# Patient Record
Sex: FEMALE | Race: ASIAN | NOT HISPANIC OR LATINO | Employment: UNEMPLOYED | URBAN - METROPOLITAN AREA
[De-identification: names, ages, dates, MRNs, and addresses within clinical notes are randomized per-mention and may not be internally consistent; named-entity substitution may affect disease eponyms.]

---

## 2023-11-20 ENCOUNTER — APPOINTMENT (OUTPATIENT)
Dept: RADIOLOGY | Facility: HOSPITAL | Age: 43
End: 2023-11-20
Attending: EMERGENCY MEDICINE
Payer: COMMERCIAL

## 2023-11-20 ENCOUNTER — APPOINTMENT (OUTPATIENT)
Dept: ULTRASOUND IMAGING | Facility: HOSPITAL | Age: 43
End: 2023-11-20
Attending: EMERGENCY MEDICINE
Payer: COMMERCIAL

## 2023-11-20 ENCOUNTER — HOSPITAL ENCOUNTER (EMERGENCY)
Facility: HOSPITAL | Age: 43
Discharge: HOME | End: 2023-11-20
Attending: EMERGENCY MEDICINE | Admitting: EMERGENCY MEDICINE
Payer: COMMERCIAL

## 2023-11-20 VITALS
WEIGHT: 140 LBS | OXYGEN SATURATION: 95 % | BODY MASS INDEX: 24.8 KG/M2 | TEMPERATURE: 98.5 F | DIASTOLIC BLOOD PRESSURE: 79 MMHG | HEIGHT: 63 IN | HEART RATE: 96 BPM | SYSTOLIC BLOOD PRESSURE: 130 MMHG | RESPIRATION RATE: 16 BRPM

## 2023-11-20 DIAGNOSIS — N39.0 UTI (URINARY TRACT INFECTION): ICD-10-CM

## 2023-11-20 DIAGNOSIS — M25.532 LEFT WRIST PAIN: ICD-10-CM

## 2023-11-20 DIAGNOSIS — H66.90 OTITIS MEDIA: ICD-10-CM

## 2023-11-20 DIAGNOSIS — J45.901 ASTHMA EXACERBATION: ICD-10-CM

## 2023-11-20 LAB
ALBUMIN MFR UR ELPH: 88.4 MG/DL
ALBUMIN SERPL BCG-MCNC: 3.9 G/DL (ref 3.5–5.2)
ALBUMIN UR-MCNC: NEGATIVE MG/DL
ALP SERPL-CCNC: 129 U/L (ref 40–150)
ALT SERPL W P-5'-P-CCNC: 40 U/L (ref 0–50)
ANION GAP SERPL CALCULATED.3IONS-SCNC: 12 MMOL/L (ref 7–15)
APPEARANCE UR: CLEAR
AST SERPL W P-5'-P-CCNC: 25 U/L (ref 0–45)
ATRIAL RATE - MUSE: 107 BPM
BACTERIA #/AREA URNS HPF: ABNORMAL /HPF
BILIRUB DIRECT SERPL-MCNC: <0.2 MG/DL (ref 0–0.3)
BILIRUB SERPL-MCNC: 0.5 MG/DL
BILIRUB UR QL STRIP: NEGATIVE
BUN SERPL-MCNC: 12.8 MG/DL (ref 6–20)
CALCIUM SERPL-MCNC: 9.2 MG/DL (ref 8.6–10)
CHLORIDE SERPL-SCNC: 103 MMOL/L (ref 98–107)
COLOR UR AUTO: ABNORMAL
CREAT SERPL-MCNC: 0.7 MG/DL (ref 0.51–0.95)
CREAT UR-MCNC: 141.6 MG/DL
DEPRECATED HCO3 PLAS-SCNC: 25 MMOL/L (ref 22–29)
DIASTOLIC BLOOD PRESSURE - MUSE: NORMAL MMHG
EGFRCR SERPLBLD CKD-EPI 2021: >90 ML/MIN/1.73M2
ERYTHROCYTE [DISTWIDTH] IN BLOOD BY AUTOMATED COUNT: 11.9 % (ref 10–15)
GLUCOSE SERPL-MCNC: 104 MG/DL (ref 70–99)
GLUCOSE UR STRIP-MCNC: NEGATIVE MG/DL
HCT VFR BLD AUTO: 38.8 % (ref 35–47)
HGB BLD-MCNC: 12.8 G/DL (ref 11.7–15.7)
HGB UR QL STRIP: ABNORMAL
HYALINE CASTS: 1 /LPF
INTERPRETATION ECG - MUSE: NORMAL
KETONES UR STRIP-MCNC: NEGATIVE MG/DL
LEUKOCYTE ESTERASE UR QL STRIP: ABNORMAL
MCH RBC QN AUTO: 30 PG (ref 26.5–33)
MCHC RBC AUTO-ENTMCNC: 33 G/DL (ref 31.5–36.5)
MCV RBC AUTO: 91 FL (ref 78–100)
MUCOUS THREADS #/AREA URNS LPF: PRESENT /LPF
NITRATE UR QL: NEGATIVE
P AXIS - MUSE: 38 DEGREES
PH UR STRIP: 7 [PH] (ref 5–7)
PLATELET # BLD AUTO: 532 10E3/UL (ref 150–450)
POTASSIUM SERPL-SCNC: 3.3 MMOL/L (ref 3.4–5.3)
PR INTERVAL - MUSE: 176 MS
PROT SERPL-MCNC: 7.8 G/DL (ref 6.4–8.3)
PROT/CREAT 24H UR: 0.62 MG/MG CR (ref 0–0.2)
QRS DURATION - MUSE: 88 MS
QT - MUSE: 338 MS
QTC - MUSE: 451 MS
R AXIS - MUSE: 32 DEGREES
RBC # BLD AUTO: 4.27 10E6/UL (ref 3.8–5.2)
RBC URINE: 1 /HPF
SODIUM SERPL-SCNC: 140 MMOL/L (ref 135–145)
SP GR UR STRIP: 1.01 (ref 1–1.03)
SQUAMOUS EPITHELIAL: 2 /HPF
SYSTOLIC BLOOD PRESSURE - MUSE: NORMAL MMHG
T AXIS - MUSE: 21 DEGREES
UROBILINOGEN UR STRIP-MCNC: <2 MG/DL
VENTRICULAR RATE- MUSE: 107 BPM
WBC # BLD AUTO: 17 10E3/UL (ref 4–11)
WBC URINE: 71 /HPF

## 2023-11-20 PROCEDURE — 93005 ELECTROCARDIOGRAM TRACING: CPT | Performed by: EMERGENCY MEDICINE

## 2023-11-20 PROCEDURE — 87086 URINE CULTURE/COLONY COUNT: CPT | Performed by: EMERGENCY MEDICINE

## 2023-11-20 PROCEDURE — 250N000009 HC RX 250: Performed by: EMERGENCY MEDICINE

## 2023-11-20 PROCEDURE — 76856 US EXAM PELVIC COMPLETE: CPT

## 2023-11-20 PROCEDURE — 94640 AIRWAY INHALATION TREATMENT: CPT

## 2023-11-20 PROCEDURE — 73110 X-RAY EXAM OF WRIST: CPT | Mod: RT

## 2023-11-20 PROCEDURE — 84156 ASSAY OF PROTEIN URINE: CPT | Performed by: EMERGENCY MEDICINE

## 2023-11-20 PROCEDURE — 71046 X-RAY EXAM CHEST 2 VIEWS: CPT

## 2023-11-20 PROCEDURE — 96360 HYDRATION IV INFUSION INIT: CPT

## 2023-11-20 PROCEDURE — 80053 COMPREHEN METABOLIC PANEL: CPT | Performed by: EMERGENCY MEDICINE

## 2023-11-20 PROCEDURE — 81001 URINALYSIS AUTO W/SCOPE: CPT | Performed by: EMERGENCY MEDICINE

## 2023-11-20 PROCEDURE — 99285 EMERGENCY DEPT VISIT HI MDM: CPT | Mod: 25

## 2023-11-20 PROCEDURE — 258N000003 HC RX IP 258 OP 636: Performed by: EMERGENCY MEDICINE

## 2023-11-20 PROCEDURE — 82248 BILIRUBIN DIRECT: CPT | Performed by: EMERGENCY MEDICINE

## 2023-11-20 PROCEDURE — 85027 COMPLETE CBC AUTOMATED: CPT | Performed by: EMERGENCY MEDICINE

## 2023-11-20 PROCEDURE — 36415 COLL VENOUS BLD VENIPUNCTURE: CPT | Performed by: EMERGENCY MEDICINE

## 2023-11-20 RX ORDER — CEFDINIR 300 MG/1
300 CAPSULE ORAL 2 TIMES DAILY
Qty: 14 CAPSULE | Refills: 0 | Status: SHIPPED | OUTPATIENT
Start: 2023-11-20 | End: 2023-11-27

## 2023-11-20 RX ORDER — ALBUTEROL SULFATE 90 UG/1
2 AEROSOL, METERED RESPIRATORY (INHALATION) EVERY 6 HOURS PRN
Qty: 18 G | Refills: 0 | Status: SHIPPED | OUTPATIENT
Start: 2023-11-20

## 2023-11-20 RX ORDER — ALBUTEROL SULFATE 5 MG/ML
2.5 SOLUTION RESPIRATORY (INHALATION) EVERY 6 HOURS PRN
Status: DISCONTINUED | OUTPATIENT
Start: 2023-11-20 | End: 2023-11-20 | Stop reason: HOSPADM

## 2023-11-20 RX ADMIN — ALBUTEROL SULFATE 2.5 MG: 2.5 SOLUTION RESPIRATORY (INHALATION) at 17:56

## 2023-11-20 RX ADMIN — SODIUM CHLORIDE 500 ML: 9 INJECTION, SOLUTION INTRAVENOUS at 14:44

## 2023-11-20 ASSESSMENT — ACTIVITIES OF DAILY LIVING (ADL)
ADLS_ACUITY_SCORE: 35
ADLS_ACUITY_SCORE: 33

## 2023-11-20 NOTE — ED TRIAGE NOTES
The pt c/o bilateral ear pain x 1 week, cough x 1 month, pt states that she has been swabbed for covid and it was negative. Pt states it is harder to hear.      Triage Assessment (Adult)       Row Name 11/20/23 1257          Triage Assessment    Airway WDL WDL        Respiratory WDL    Respiratory WDL WDL        Skin Circulation/Temperature WDL    Skin Circulation/Temperature WDL WDL        Cardiac WDL    Cardiac WDL WDL        Peripheral/Neurovascular WDL    Peripheral Neurovascular WDL WDL        Cognitive/Neuro/Behavioral WDL    Cognitive/Neuro/Behavioral WDL WDL

## 2023-11-21 LAB — BACTERIA UR CULT: NORMAL

## 2023-11-21 NOTE — ED PROVIDER NOTES
EMERGENCY DEPARTMENT ENCOUNTER      NAME: Raj Castellanos  AGE: 43 year old female  YOB: 1980  MRN: [id number]  EVALUATION DATE & TIME: 11/20/2023 12:57 PM    PCP: Shiva Bliss    ED PROVIDER: Bhargavi Johns PA-C      Chief Complaint   Patient presents with    Otalgia         FINAL IMPRESSION:  1. Otitis media    2. UTI (urinary tract infection)    3. Left wrist pain    4. Asthma exacerbation          MEDICAL DECISION MAKING:    Pertinent Labs & Imaging studies reviewed. (See chart for details)  43 year old female presents to the Emergency Department for evaluation of ear pain, abdominal discomfort, cough.  The patient has been dealing with cough and ear pain for the past week as well as some intermittent abdominal/waist discomfort since delivering her baby on 11/6/2023.  She was concerned about her worsening ear pain and cough and presented to the emergency department.  On my evaluation the patient was initially tensive at 146/77, but otherwise vitally stable.  Examination with scattered expiratory wheezes with intermittent cough.  Speaks in full sentences without any respiratory distress.  Abdomen soft, nontender without any rebound or guarding.  No CVA tenderness.  Bilateral TMs with erythema and bulging.  External ear canals normal.  Differential diagnosis included preeclampsia, UTI, retained products of conception, endometritis, otitis media, asthma, pneumonia.    With patient's initial blood pressure elevated at 146/77 I did have concern for preeclampsia especially with the patient's history of this.  She was recently seen by her OB/GYN on 11/16/2023 and blood pressure was normal at that time.  Patient had not been taking her labetalol and nifedipine as prescribed however, with normal blood pressures she did not feel restarting this was indicated.  I obtained labs including CBC, BMP, LFTs, protein random urine, UA.  CBC with elevated white blood cell count of 17 and high platelets of 532 and I do  suspect that she likely has some hemoconcentration but I am concerned for infection as well.  BMP without significant normalities.  LFTs within normal limits.  Random protein in the urine slightly elevated at 0.62.  I discussed the patient with the on-call OB/GYN Dr. Mata who did not feel restarting blood pressure medication was indicated and as blood pressure is improving here in the emergency department without any intervention, no symptoms of preeclampsia, and no significant findings on labs she should just follow-up normally with OB/GYN.  UA with concerns for infection.  Pelvic ultrasound was performed to the patient's symptoms.  I was called by [facility] for concern for AVM as well as retained products of conception versus simple fluid versus clots.  I repaged OB/GYN and spoke with Dr. Carter who reviewed the ultrasound images and did not feel this was an AVM and did feel it was likely vasculature from her recent delivery and that there is nothing that is abnormal for the her postpartum postpartum at this time.  There is no concern for endometritis at this time.  Patient also had some wheezing on exam and I did give the patient a nebulizer and obtained a chest x-ray.  No signs of pneumonia.  Patient feeling improved after interventions.  Lastly, patient reported some right wrist pain that has been ongoing for several months.  Examination unremarkable without any tenderness to palpation and normal CMS.  Obtained x-ray which did not show any fracture, subluxation or other acute findings.  At this time, I do not feel patient requires admission to the hospital and has close follow-up on Friday for recheck.  We discussed findings, treatment with antibiotics for the ear infection as well as albuterol as needed for cough and chest tightness.  We discussed reasons to return to the emergency department and all questions were answered best my ability.  She was discharged in stable condition.    Medical  Decision Making    History:  Supplemental history from: Documented in chart, if applicable  External Record(s) reviewed: Documented in chart, if applicable.    Work Up:  Chart documentation includes differential considered and any EKGs or imaging independently interpreted by provider, where specified.  In additional to work up documented, I considered the following work up: Documented in chart, if applicable.    External consultation:  Discussion of management with another provider: CARLA    Complicating factors:  Care impacted by chronic illness: N/A  Care affected by social determinants of health: Access to Medical Care    Disposition considerations: Discharge. I prescribed additional prescription strength medication(s) as charted. See documentation for any additional details.         ED COURSE:  1:30 PM I met with the patient, obtained history, performed an initial exam, and discussed options and plan for diagnostics and treatment here in the ED.    3:50 PM I discussed the patient with Dr. Mata who did not recommend blood pressure medications for preeclampsia as she does not feel that the patient is undergoing this with resolution of blood pressures.  She believes that with normal blood pressures with her follow-up with OB/GYN that her blood pressure here is elevated today due to her symptoms of her asthma exacerbation and otitis media.    7:04 PM spoke with the on-call OB/GYN Dr. Carter who reviewed the ultrasound, did not recommend to follow-up with IR as she did not feel this was an AVM and recommended follow-up with OB/GYN as she has scheduled.  She did discussed return precautions with me which I will relay to the patient.    7:22 PM Patient discharged after being provided with extensive anticipatory guidance and given return precautions, importance of PCP follow-up emphasized.    At the conclusion of the encounter I discussed the results of all of the tests and the disposition. The questions were  answered. The patient or family acknowledged understanding and was agreeable with the care plan.     MEDICATIONS GIVEN IN THE EMERGENCY:  Medications   albuterol (PROVENTIL) neb solution 2.5 mg (2.5 mg Nebulization $Given 11/20/23 4163)   sodium chloride 0.9% BOLUS 500 mL (0 mLs Intravenous Stopped 11/20/23 5448)       NEW PRESCRIPTIONS STARTED AT TODAY'S ER VISIT  Discharge Medication List as of 11/20/2023  7:30 PM        START taking these medications    Details   albuterol (PROAIR HFA/PROVENTIL HFA/VENTOLIN HFA) 108 (90 Base) MCG/ACT inhaler Inhale 2 puffs into the lungs every 6 hours as needed for shortness of breath, wheezing or cough, Disp-18 g, R-0, Local PrintPharmacy may dispense brand covered by insurance (Proair, or proventil or ventolin or generic albuterol inhaler)      cefdinir (OMNICEF) 300 MG capsule Take 1 capsule (300 mg) by mouth 2 times daily for 7 days, Disp-14 capsule, R-0, Local Print                  =================================================================    HPI:    Patient information was obtained from: The patient    Use of Interpretor: Yes (Phone) - Language Debby ESCAMILLA Emanuel is a 43 year old female who presents to this ED private vehicle for evaluation of ear pain and cough.  The patient was has been struggling with a cough and ear pain for the last week and was concerned about her symptoms which present to the emergency department.  On my evaluation, the patient denies any fevers or chills.  She reports that she has had some chest tightness but no difficulty breathing.  She is not coughing up any blood.  She reports some waist pain and some vaginal bleeding however, this has not increased since delivery of her child on 11/6/2023.  She denies any nausea or vomiting.  She has not had any urinary symptoms.  No difficulty swallowing or difficulty breathing.  No other concerns voiced at this time.      REVIEW OF SYSTEMS:  Negative unless otherwise stated in the above HPI.        PAST MEDICAL HISTORY:  No past medical history on file.    PAST SURGICAL HISTORY:  No past surgical history on file.        CURRENT MEDICATIONS:      Current Facility-Administered Medications:     albuterol (PROVENTIL) neb solution 2.5 mg, 2.5 mg, Nebulization, Q6H PRN, Bhargavi Johns PA-C, 2.5 mg at 11/20/23 1756    Current Outpatient Medications:     albuterol (PROAIR HFA/PROVENTIL HFA/VENTOLIN HFA) 108 (90 Base) MCG/ACT inhaler, Inhale 2 puffs into the lungs every 6 hours as needed for shortness of breath, wheezing or cough, Disp: 18 g, Rfl: 0    cefdinir (OMNICEF) 300 MG capsule, Take 1 capsule (300 mg) by mouth 2 times daily for 7 days, Disp: 14 capsule, Rfl: 0    metoclopramide (REGLAN) 10 MG tablet, [METOCLOPRAMIDE (REGLAN) 10 MG TABLET] 10 mg po qid 30 min prior to mealtimes and qhs as needed for nausea., Disp: 40 tablet, Rfl: 0    prenatal 21-iron fu-folic acid (PRENATAL COMPLETE) 14 mg iron- 400 mcg Tab, [PRENATAL 21-IRON FU-FOLIC ACID (PRENATAL COMPLETE) 14 MG IRON- 400 MCG TAB] Take 1 tablet by mouth daily., Disp: 90 tablet, Rfl: 0      ALLERGIES:  No Known Allergies    FAMILY HISTORY:  No family history on file.    SOCIAL HISTORY:   Social History     Socioeconomic History    Marital status:    Tobacco Use    Smoking status: Never    Smokeless tobacco: Never   Substance and Sexual Activity    Alcohol use: No    Drug use: No       VITALS:  Patient Vitals for the past 24 hrs:   BP Temp Temp src Pulse Resp SpO2 Height Weight   11/20/23 1934 130/79 -- -- 96 -- 95 % -- --   11/20/23 1829 126/79 -- -- 99 -- 99 % -- --   11/20/23 1805 126/64 -- -- 94 -- 94 % -- --   11/20/23 1511 -- -- -- 99 -- 96 % -- --   11/20/23 1456 134/80 -- -- 98 -- 97 % -- --   11/20/23 1445 132/81 -- -- 103 -- 96 % -- --   11/20/23 1441 -- -- -- 101 -- 95 % -- --   11/20/23 1426 131/78 -- -- 105 -- 95 % -- --   11/20/23 1400 137/84 -- -- 108 -- 95 % -- --   11/20/23 1255 (!) 146/77 -- -- 115 16 94 % -- --   11/20/23  "1254 -- 98.5  F (36.9  C) Temporal -- -- -- 1.6 m (5' 3\") 63.5 kg (140 lb)       PHYSICAL EXAM    Constitutional: Well developed, Well nourished, NAD  HENT: Normocephalic, Atraumatic, Bilateral external ears normal, Oropharynx normal, mucous membranes moist, Nose normal.   Neck: Normal range of motion, No tenderness, Supple, No stridor.  Eyes: Eyes track normally throughout exam conjunctiva normal, No discharge.   Respiratory: Normal breath sounds, No respiratory distress, mild scattered expiratory wheeze, Speaks full sentences easily.  Intermittent cough.  Cardiovascular: Normal heart rate, Regular rhythm, No murmurs, No rubs, No gallops. Chest wall nontender.  GI: Soft, No tenderness, No masses, No flank tenderness. No rebound or guarding.  Musculoskeletal: Good range of motion in all major joints.  Integument: Warm, Dry, No erythema, No rash. No petechiae.  Neurologic: Alert & oriented x 3, Normal motor function, Normal sensory function, No focal deficits noted. Normal gait.  Psychiatric: Affect normal, Judgment normal, Mood normal. Cooperative.    LAB:  All pertinent labs reviewed and interpreted.  Recent Results (from the past 24 hour(s))   CBC (+ platelets, no diff)    Collection Time: 11/20/23  2:05 PM   Result Value Ref Range    WBC Count 17.0 (H) 4.0 - 11.0 10e3/uL    RBC Count 4.27 3.80 - 5.20 10e6/uL    Hemoglobin 12.8 11.7 - 15.7 g/dL    Hematocrit 38.8 35.0 - 47.0 %    MCV 91 78 - 100 fL    MCH 30.0 26.5 - 33.0 pg    MCHC 33.0 31.5 - 36.5 g/dL    RDW 11.9 10.0 - 15.0 %    Platelet Count 532 (H) 150 - 450 10e3/uL   Basic metabolic panel    Collection Time: 11/20/23  2:05 PM   Result Value Ref Range    Sodium 140 135 - 145 mmol/L    Potassium 3.3 (L) 3.4 - 5.3 mmol/L    Chloride 103 98 - 107 mmol/L    Carbon Dioxide (CO2) 25 22 - 29 mmol/L    Anion Gap 12 7 - 15 mmol/L    Urea Nitrogen 12.8 6.0 - 20.0 mg/dL    Creatinine 0.70 0.51 - 0.95 mg/dL    GFR Estimate >90 >60 mL/min/1.73m2    Calcium 9.2 8.6 - " 10.0 mg/dL    Glucose 104 (H) 70 - 99 mg/dL   Hepatic function panel    Collection Time: 11/20/23  2:05 PM   Result Value Ref Range    Protein Total 7.8 6.4 - 8.3 g/dL    Albumin 3.9 3.5 - 5.2 g/dL    Bilirubin Total 0.5 <=1.2 mg/dL    Alkaline Phosphatase 129 40 - 150 U/L    AST 25 0 - 45 U/L    ALT 40 0 - 50 U/L    Bilirubin Direct <0.20 0.00 - 0.30 mg/dL   Protein  random urine    Collection Time: 11/20/23  2:08 PM   Result Value Ref Range    Total Protein Urine mg/dL 88.4   mg/dL    Total Protein Urine mg/mg Creat 0.62 (H) 0.00 - 0.20 mg/mg Cr    Creatinine Urine mg/dL 141.6 mg/dL   UA with Microscopic reflex to Culture    Collection Time: 11/20/23  4:49 PM    Specimen: Urine, Midstream   Result Value Ref Range    Color Urine Light Yellow Colorless, Straw, Light Yellow, Yellow    Appearance Urine Clear Clear    Glucose Urine Negative Negative mg/dL    Bilirubin Urine Negative Negative    Ketones Urine Negative Negative mg/dL    Specific Gravity Urine 1.010 1.001 - 1.030    Blood Urine >1.0 mg/dL (A) Negative    pH Urine 7.0 5.0 - 7.0    Protein Albumin Urine Negative Negative mg/dL    Urobilinogen Urine <2.0 <2.0 mg/dL    Nitrite Urine Negative Negative    Leukocyte Esterase Urine 500 Jenelle/uL (A) Negative    Bacteria Urine Few (A) None Seen /HPF    Mucus Urine Present (A) None Seen /LPF    RBC Urine 1 <=2 /HPF    WBC Urine 71 (H) <=5 /HPF    Squamous Epithelials Urine 2 (H) <=1 /HPF    Hyaline Casts Urine 1 <=2 /LPF         RADIOLOGY:  Reviewed all pertinent imaging. Please see official radiology report.  US Pelvis Cmpl wo Transvaginal w Abd/Pel Duplex Lmt   Final Result   IMPRESSION:     1.  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 Gynecologic consultation is recommended.   2.  Fluid and debris within the endometrial cavity, which may reflect a combination of simple fluid, blood products, retained products of conception.      Findings and  recommendations discussed verbally via telephone with SARA Johns at 6:07 PM on 11/20/2023.      XR Wrist Right G/E 3 Views   Final Result   IMPRESSION: No acute fracture or malalignment. No significant degenerative changes.      Chest XR,  PA & LAT   Final Result   IMPRESSION: Negative chest.          PROCEDURES:   None.     Bhargavi Johns PA-C  Emergency Medicine  [facility]  11/20/2023      Bhargavi Johns PA-C  11/20/23 2013       Bhargavi Johns PA-C  11/20/23 2043

## 2023-11-21 NOTE — DISCHARGE INSTRUCTIONS
Seen and evaluated here in the emergency department for your cough, chest tightness and ear pain.  You do have an ear infection as well as a urinary tract infection.  Additionally, I do feel he likely had an asthma exacerbation.  I will discharge you home with cefdinir which will cover for both your urinary tract infection and your ear infection.  Use your albuterol inhaler as needed for cough, chest tightness and any difficulty breathing.    Please follow-up with your OB/GYN as scheduled however, if you develop severe worsening pain, bleeding or other concerns return to the emergency department.  You will have some increased clots that you will be passing as there are clots within the uterus that will need to pass.  However, if you develop severe bleeding where you are going through a pad per hour you should return.    Again if you develop severe worsening abdominal pain, fevers, uncontrolled vomiting, chest pain, shortness of breath or other concerns return to the emergency department.

## 2023-11-21 NOTE — ED PROVIDER NOTES
"Emergency Department Midlevel Supervisory Note     I personally saw the patient and performed a substantive portion of the visit including all aspects of the medical decision making.    ED Course:  6:10 PM Alice Ontiveros PA-C staffed patient with me. I agree with their assessment and plan of management, and I will see the patient.   I met with the patient to introduce myself, gather additional history, perform my initial exam, and discuss the plan.     Brief HPI:     Raj Castellanos is a 43 year old female who presents for evaluation of otalgia.      I, Starr Hough, am serving as a scribe to document services personally performed by Dr. Young based on my observation and the provider's statements to me.  I, Kimmie Young MD attest that Starr Hough is acting in a scribe capacity, has observed my performance of the services and has documented them in accordance with my direction.    Brief Physical Exam: /79   Pulse 99   Temp 98.5  F (36.9  C) (Temporal)   Resp 16   Ht 1.6 m (5' 3\")   Wt 63.5 kg (140 lb)   SpO2 99%   BMI 24.80 kg/m    Constitutional:  Alert, in no acute distress       MDM:  43-year-old female who is 2 weeks postpartum who initially came in for ear pain.  We then noticed some elevated blood pressures and in the setting of a patient who is 2 weeks postpartum initiated a work-up to rule out preeclampsia.  Fortunately her work-up is reassuring and blood pressure at reassessment is 126/79.  We discussed the case with OB and they were comfortable with the discharge plan.  We did end up doing a pelvic ultrasound due to some ongoing symptoms that she complained of pain, and there was some concern for retained products versus AVM.  This was again reviewed with OB and they do not see any findings that would be atypical for the postpartum course.  She has no significant increased bleeding or signs of endometritis.  At this time, will discharge with reassurance and return precautions.       1. Otitis " media    2. UTI (urinary tract infection)    3. Left wrist pain    4. Asthma exacerbation        Labs and Imaging:  Results for orders placed or performed during the hospital encounter of 11/20/23   Chest XR,  PA & LAT    Impression    IMPRESSION: Negative chest.   XR Wrist Right G/E 3 Views    Impression    IMPRESSION: No acute fracture or malalignment. No significant degenerative changes.   US Pelvis Cmpl wo Transvaginal w Abd/Pel Duplex Lmt    Impression    IMPRESSION:    1.  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 Gynecologic consultation is recommended.  2.  Fluid and debris within the endometrial cavity, which may reflect a combination of simple fluid, blood products, retained products of conception.    Findings and recommendations discussed verbally via telephone with PA Nat at 6:07 PM on 11/20/2023.   CBC (+ platelets, no diff)   Result Value Ref Range    WBC Count 17.0 (H) 4.0 - 11.0 10e3/uL    RBC Count 4.27 3.80 - 5.20 10e6/uL    Hemoglobin 12.8 11.7 - 15.7 g/dL    Hematocrit 38.8 35.0 - 47.0 %    MCV 91 78 - 100 fL    MCH 30.0 26.5 - 33.0 pg    MCHC 33.0 31.5 - 36.5 g/dL    RDW 11.9 10.0 - 15.0 %    Platelet Count 532 (H) 150 - 450 10e3/uL   Basic metabolic panel   Result Value Ref Range    Sodium 140 135 - 145 mmol/L    Potassium 3.3 (L) 3.4 - 5.3 mmol/L    Chloride 103 98 - 107 mmol/L    Carbon Dioxide (CO2) 25 22 - 29 mmol/L    Anion Gap 12 7 - 15 mmol/L    Urea Nitrogen 12.8 6.0 - 20.0 mg/dL    Creatinine 0.70 0.51 - 0.95 mg/dL    GFR Estimate >90 >60 mL/min/1.73m2    Calcium 9.2 8.6 - 10.0 mg/dL    Glucose 104 (H) 70 - 99 mg/dL   Hepatic function panel   Result Value Ref Range    Protein Total 7.8 6.4 - 8.3 g/dL    Albumin 3.9 3.5 - 5.2 g/dL    Bilirubin Total 0.5 <=1.2 mg/dL    Alkaline Phosphatase 129 40 - 150 U/L    AST 25 0 - 45 U/L    ALT 40 0 - 50 U/L    Bilirubin Direct <0.20 0.00 - 0.30 mg/dL   UA with  Microscopic reflex to Culture    Specimen: Urine, Midstream   Result Value Ref Range    Color Urine Light Yellow Colorless, Straw, Light Yellow, Yellow    Appearance Urine Clear Clear    Glucose Urine Negative Negative mg/dL    Bilirubin Urine Negative Negative    Ketones Urine Negative Negative mg/dL    Specific Gravity Urine 1.010 1.001 - 1.030    Blood Urine >1.0 mg/dL (A) Negative    pH Urine 7.0 5.0 - 7.0    Protein Albumin Urine Negative Negative mg/dL    Urobilinogen Urine <2.0 <2.0 mg/dL    Nitrite Urine Negative Negative    Leukocyte Esterase Urine 500 Jenelle/uL (A) Negative    Bacteria Urine Few (A) None Seen /HPF    Mucus Urine Present (A) None Seen /LPF    RBC Urine 1 <=2 /HPF    WBC Urine 71 (H) <=5 /HPF    Squamous Epithelials Urine 2 (H) <=1 /HPF    Hyaline Casts Urine 1 <=2 /LPF   Protein  random urine   Result Value Ref Range    Total Protein Urine mg/dL 88.4   mg/dL    Total Protein Urine mg/mg Creat 0.62 (H) 0.00 - 0.20 mg/mg Cr    Creatinine Urine mg/dL 141.6 mg/dL     I have reviewed the relevant laboratory and radiology studies    Procedures:  I was present for the key portions of this procedure: none    Kimmie Young M.D.  [facility] EMERGENCY DEPARTMENT  [facility] [facility] [address] [phone]     Kimmie Young MD  11/20/23 2042